# Patient Record
Sex: FEMALE | Race: WHITE
[De-identification: names, ages, dates, MRNs, and addresses within clinical notes are randomized per-mention and may not be internally consistent; named-entity substitution may affect disease eponyms.]

---

## 2021-03-20 ENCOUNTER — HOSPITAL ENCOUNTER (EMERGENCY)
Dept: HOSPITAL 7 - FB.ED | Age: 61
Discharge: HOME | End: 2021-03-20
Payer: MEDICAID

## 2021-03-20 DIAGNOSIS — Z79.82: ICD-10-CM

## 2021-03-20 DIAGNOSIS — M79.621: ICD-10-CM

## 2021-03-20 DIAGNOSIS — Z79.899: ICD-10-CM

## 2021-03-20 DIAGNOSIS — J44.9: ICD-10-CM

## 2021-03-20 DIAGNOSIS — I63.9: Primary | ICD-10-CM

## 2021-03-20 DIAGNOSIS — G81.94: ICD-10-CM

## 2021-03-20 DIAGNOSIS — Z87.891: ICD-10-CM

## 2021-03-20 NOTE — EDM.PDOC
ED HPI GENERAL MEDICAL PROBLEM





- General


Stated Complaint: ARM/WRIST PAIN


Time Seen by Provider: 03/20/21 12:30


Source of Information: Reports: Patient, Family (Patient's daughter)


History Limitations: Reports: Other (Patient is status post stroke and history 

is difficult from her but the daughter gives most of the history.)





- History of Present Illness


INITIAL COMMENTS - FREE TEXT/NARRATIVE: 





60-year-old female who had a left sided stroke resulting in right upper 

extremity weakness and aphasia on 1/23/2021. She was admitted to [facility] from 1/23 to 1/29 and then she was transferred to a nursing home in 

[place] for rehabilitation where she has been until yesterday. All during this 

time she has complained of right upper extremity pain and there has been right 

upper extremity swelling. There has never been any x-ray evaluation of the upper

extremity. Apparently, when the patient had her stroke she fell on her right 

side and may have had trauma to her right upper extremity at that time. Today, 

when the patient was hugging a relative, she was noted to cry out in pain and 

was pointing to her right upper extremity complaining of pain. She is really not

able to localize the pain along her right upper extremity and her dysphasia 

makes it difficult as she is not really able to converse with me she seems to 

have pain along the entire right upper extremity from the humerus down to the 

hand. She rated the pain as a 5/10. She has not able to qualitate the pain. The 

patient's daughter was able to "fill in the gaps" of the history. The patient is

awake and alert but her dysphasia did not allow her to give a complete history. 

No chest pain. No difficulty breathing. There are no other associated signs or 

symptoms. There are no other modifying factors.


  ** Right Upper Arm


Pain Score (Numeric/FACES): 5





- Related Data


                                    Allergies











Allergy/AdvReac Type Severity Reaction Status Date / Time


 


No Known Allergies Allergy   Verified 03/20/21 12:46











Home Meds: 


                                    Home Meds





Aspirin 81 mg PO DAILY 03/20/21 [History]


Escitalopram [Lexapro] 10 mg PO DAILY 03/20/21 [History]











Past Medical History


Respiratory History: Reports: COPD


Neurological History: Reports: CVA, Migraines


Psychiatric History: Reports: Anxiety, Depression





- Past Surgical History


HEENT Surgical History: Reports: Other (See Below) (Ear surgery)





Social & Family History





- Tobacco Use


Tobacco Use Status *Q: Former Tobacco User (Quit smoking on 1/23/2021)





- Alcohol Use


Alcohol Use History: No





- Living Situation & Occupation


Living situation: Reports: 





ED ROS GENERAL





- Review of Systems


Review Of Systems: See Below


Constitutional: Reports: No Symptoms


HEENT: Reports: No Symptoms


Respiratory: Reports: No Symptoms


Cardiovascular: Reports: No Symptoms


Endocrine: Reports: No Symptoms


GI/Abdominal: Reports: No Symptoms


: Reports: No Symptoms


Musculoskeletal: Reports: Other (Right hand dominant. Right upper extremity 

pain. Diffuse swelling of right upper extremity since her CVA.)


Skin: Reports: No Symptoms


Neurological: Reports: No Symptoms (Other than the right hemiparesis and the 

dysphasia)


Hematologic/Lymphatic: Reports: No Symptoms


Immunologic: Reports: No Symptoms





ED EXAM, GENERAL





- Physical Exam


Exam: See Below


Exam Limited By: No Limitations


General Appearance: Alert, WD/WN, No Apparent Distress


Eye Exam: Bilateral Eye: EOMI, Normal Inspection


Ears: Normal External Exam, Hearing Grossly Normal


Ear Exam: Bilateral Ear: Auricle Normal


Nose: Normal Inspection, Normal Mucosa, No Blood


Throat/Mouth: Normal Inspection, No Airway Compromise


Head: Atraumatic, Normocephalic


Neck: Normal Inspection, Supple, Non-Tender, Full Range of Motion


Respiratory/Chest: No Respiratory Distress, Lungs Clear, Normal Breath Sounds, 

No Accessory Muscle Use, Chest Non-Tender


Cardiovascular: Normal Peripheral Pulses, Regular Rate, Rhythm, No Murmur


Peripheral Pulses: 4+: Radial (L), Radial (R)


GI/Abdominal: Normal Bowel Sounds, Soft, Non-Tender


Back Exam: Normal Inspection, Full Range of Motion


Extremities: No Pedal Edema, Normal Capillary Refill, Arm Pain (Diffuse pain 

with palpation from the upper arm to the hand. No crepitus or bony deformity 

noted.)


Neurological: Alert, Oriented, CN II-XII Intact, Other (Right upper extremity 

hemiparesis. Aphasia. These are both old status post 1/23/2021.)


Psychiatric: Normal Affect


Skin Exam: Warm, Dry, Intact, Normal Color, No Rash





Course





- Vital Signs


Last Recorded V/S: 


                                Last Vital Signs











Temp  36.8 C   03/20/21 12:10


 


Pulse  75   03/20/21 12:10


 


Resp  18   03/20/21 12:10


 


BP  124/82   03/20/21 12:10


 


Pulse Ox  98   03/20/21 12:10














- Orders/Labs/Meds


Orders: 


                               Active Orders 24 hr











 Category Date Time Status


 


 Elbow Min 3V Rt [CR] Stat Exams  03/20/21 12:41 Taken


 


 Forearm 2V Rt [CR] Stat Exams  03/20/21 12:41 Taken


 


 Hand Comp Min 3V Rt [CR] Stat Exams  03/20/21 12:41 Taken


 


 Humerus Rt [CR] Stat Exams  03/20/21 12:41 Taken














- Radiology Interpretation


Free Text/Narrative:: 





X-ray of right humerus showed no definite fracture per my read.





X-ray of right elbow showed no definite fracture per my read.





X-ray of right forearm shows no definite fracture per my read.





X-ray of right hand shows no definite fracture in the hand or the wrist per my 

read.





- Re-Assessments/Exams


Free Text/Narrative Re-Assessment/Exam: 





03/20/21 13:25: Patient remains hemodynamically and neurologically stable. The 

x-rays were reviewed by myself and I saw no fracture or dislocation. The 

radiology over read is pending at this point. I discussed this with the and with

 her daughter. For now she should use her right upper extremity as tolerated. 

They should arrange to see Dr. Ray next week if possible.








Departure





- Departure


Time of Disposition: 13:35


Disposition: Home, Self-Care 01


Condition: Good


Clinical Impression: 


 Pain of right upper extremity, Cerebrovascular accident (CVA) involving left 

cerebral hemisphere








- Discharge Information


Additional Instructions: 


The x-rays of her right upper arm, elbow, forearm, wrist and hand showed no 

definite fracture or dislocation. The radiologist will over read the x-rays and 

if there a fracture that I missed, we will call you. I am unsure why she is 

having the pain but I think it may be post stroke pain. You should for follow-up

 with Dr. Ray next week. She can take Tylenol 1000 mg by mouth every 6 hours 

as needed for pain. Back to the emergency department for redness in the right 

upper extremity, fever, increasing swelling or any other concerning sign or 

symptom.





Sepsis Event Note (ED)





- Evaluation


Sepsis Screening Result: No Definite Risk





- Focused Exam


Vital Signs: 


                                   Vital Signs











  Temp Pulse Resp BP Pulse Ox


 


 03/20/21 12:10  36.8 C  75  18  124/82  98














- My Orders


Last 24 Hours: 


My Active Orders





03/20/21 12:41


Elbow Min 3V Rt [CR] Stat 


Forearm 2V Rt [CR] Stat 


Hand Comp Min 3V Rt [CR] Stat 


Humerus Rt [CR] Stat 














- Assessment/Plan


Last 24 Hours: 


My Active Orders





03/20/21 12:41


Elbow Min 3V Rt [CR] Stat 


Forearm 2V Rt [CR] Stat 


Hand Comp Min 3V Rt [CR] Stat 


Humerus Rt [CR] Stat

## 2021-03-22 NOTE — CR
INDICATION:  Fall with injury.  



RIGHT FOREARM:  Frontal and lateral views of the right forearm were obtained 
03/20/21 - no comparisons.  An acute fracture, dislocation or other significant 
bone or joint abnormality, was not identified.  

ROBBIED

## 2021-03-22 NOTE — CR
INDICATION:  Fall with injuries.  



RIGHT ELBOW:  Three views of the right elbow revealed minimal hypertrophic 
degenerative changers at the medial elbow joint compartment with joint space 
well maintained and no other significant bone or joint abnormality identified - 
bi acute fracture or dislocation or joint effusion was seen.  



MTDD

## 2021-03-22 NOTE — CR
INDICATION:  Fall with injury.  



RIGHT HUMERUS:  Frontal and lateral views of the right humerus revealed no 
evidence of an acute fracture, dislocation or other definite bone or joint 
abnormality.  



Adjacent ribs appear to be unremarkable.  





MTDD

## 2021-03-22 NOTE — CR
INDICATION:  Fall with injury.  



RIGHT HAND:     Three views of the right hand were obtained 03/20/21 - no 
comparisons.  



Moderate component with severe hypertrophic degenerative changes and subchondral
cystic changes are noted at the interphalangeal joint of the thumb.  The 
appearance may be on the basis of posttraumatic osteoarthritis and should be 
correlated clinically.



There is some minimal degenerative change at the naviculomultangular joints and 
mild degenerative change at the first metacarpocarpal joint.  



Minimal degenerative changes are also noted at the DIPJs of the second and to 
lesser extent third fingers and fifth finger.  



Minimal [id number] change is noted at the first and second 
metacarpophalangeal joints.  



An acute fracture or dislocation was not identified.  



IMPRESSION: 

1.  No acute or dislocation.  

2.  Osteoarthritis.  

MTDD

## 2021-04-20 ENCOUNTER — HOSPITAL ENCOUNTER (EMERGENCY)
Dept: HOSPITAL 7 - FB.ED | Age: 61
Discharge: HOME | End: 2021-04-20
Payer: MEDICAID

## 2021-04-20 DIAGNOSIS — Z87.891: ICD-10-CM

## 2021-04-20 DIAGNOSIS — J44.9: ICD-10-CM

## 2021-04-20 DIAGNOSIS — M79.641: ICD-10-CM

## 2021-04-20 DIAGNOSIS — Z79.899: ICD-10-CM

## 2021-04-20 DIAGNOSIS — G89.29: ICD-10-CM

## 2021-04-20 DIAGNOSIS — R10.84: Primary | ICD-10-CM

## 2021-04-20 DIAGNOSIS — Z86.73: ICD-10-CM

## 2021-04-20 DIAGNOSIS — Z79.82: ICD-10-CM

## 2021-04-20 DIAGNOSIS — M54.5: ICD-10-CM

## 2021-04-20 PROCEDURE — 85025 COMPLETE CBC W/AUTO DIFF WBC: CPT

## 2021-04-20 PROCEDURE — 83735 ASSAY OF MAGNESIUM: CPT

## 2021-04-20 PROCEDURE — 81001 URINALYSIS AUTO W/SCOPE: CPT

## 2021-04-20 PROCEDURE — 99284 EMERGENCY DEPT VISIT MOD MDM: CPT

## 2021-04-20 PROCEDURE — 96374 THER/PROPH/DIAG INJ IV PUSH: CPT

## 2021-04-20 PROCEDURE — 73110 X-RAY EXAM OF WRIST: CPT

## 2021-04-20 PROCEDURE — 36415 COLL VENOUS BLD VENIPUNCTURE: CPT

## 2021-04-20 PROCEDURE — 74177 CT ABD & PELVIS W/CONTRAST: CPT

## 2021-04-20 PROCEDURE — 72131 CT LUMBAR SPINE W/O DYE: CPT

## 2021-04-20 PROCEDURE — 96375 TX/PRO/DX INJ NEW DRUG ADDON: CPT

## 2021-04-20 PROCEDURE — 80053 COMPREHEN METABOLIC PANEL: CPT

## 2021-04-20 PROCEDURE — 86140 C-REACTIVE PROTEIN: CPT

## 2021-04-20 PROCEDURE — 83690 ASSAY OF LIPASE: CPT

## 2021-04-20 NOTE — EDM.PDOC
ED HPI GENERAL MEDICAL PROBLEM





- General


Chief Complaint: Back Pain or Injury


Stated Complaint: FELL DOWN STAIRS/BACK PAIN


Time Seen by Provider: 04/20/21 14:50


Source of Information: Reports: Patient, Family (Patient's daughter)


History Limitations: Reports: Other (Patient with expressive aphasia and has 

problems verbally communicating.)





- History of Present Illness


INITIAL COMMENTS - FREE TEXT/NARRATIVE: 





60-year-old female who came back from speech therapy at 12:45 PM today and was 

complaining of severe pain in her back and in her right hand and wrist area. 

Apparently last night at about 6:45 PM she was walking down the stairs in to the

living room and they were carpeted and she slipped on the stairs but did not 

fall just slid down the stairs and caught herself on the couch. She did not seem

to have any pain at the time except in her right wrist and hand. She also seemed

to be fine this morning with just some complaints of right wrist and hand pain 

which has been chronic since an injury several months ago. She was crying in 

pain today and her daughter brought her to the emergency department for 

evaluation. The patient has an expressive aphasia from a previous CVA and it is 

very difficult to get history from the patient as she cannot verbally 

communicate. She appears to be a level 10/10 the pain right now points to her 

entire abdomen and to her lumbar back when she reports the pain. She cannot 

describe it any more than this. There is been no nausea or vomiting. She 

apparently ate and drank normally this morning. She denies any chest pain. No 

urinary problems. There are no other associated signs or symptoms. There are no 

other modifying factors.


Onset: Other (Mild pain last night after the near fall but worse since 12:45 PM)


Duration: Getting Worse


Location: Reports: Abdomen, Back, Upper Extremity, Right


Quality: Reports: Other (Unable)


Severity: Severe


Improves with: Reports: None


Worsens with: Reports: Other (Palpation), Movement


Context: Reports: Other (As above.)


Associated Symptoms: Reports: No Other Symptoms (Except as above.)


Treatments PTA: Reports: Acetaminophen, Cold Therapy, Heat Therapy, NSAIDS





- Related Data


                                    Allergies











Allergy/AdvReac Type Severity Reaction Status Date / Time


 


No Known Allergies Allergy   Verified 04/20/21 14:30











Home Meds: 


                                    Home Meds





Aspirin 81 mg PO DAILY 03/20/21 [History]


Gabapentin [Neurontin] 100 mg PO TID 04/20/21 [History]


Orphenadrine [Norflex] 100 mg PO BID PRN #12 tab 04/20/21 [Rx]


buPROPion [buPROPion XL] 150 mg PO DAILY 04/20/21 [History]


traMADol [Ultram] 50 mg PO Q6H PRN #12 tab 04/20/21 [Rx]











Past Medical History


Respiratory History: Reports: COPD


Neurological History: Reports: CVA, Migraines


Other Neuro History: Hx of left side stroke (due to a clot in her leg per 

daughter report).  R sided hemiparesis.  Expressive aphasia.


Psychiatric History: Reports: Anxiety, Depression





- Past Surgical History


Other Surgical History Comment: No previous surgeries.





Social & Family History





- Tobacco Use


Tobacco Use Status *Q: Former Tobacco User


Used Tobacco, but Quit: Yes


Month/Year Tobacco Last Used: 2021





- Caffeine Use


Caffeine Use: Reports: Soda





- Alcohol Use


Alcohol Use History: No





- Recreational Drug Use


Recreational Drug Use: No





- Living Situation & Occupation


Living situation: Reports: 


Social History Comment: Lives with her daughter.





ED ROS GENERAL





- Review of Systems


Review Of Systems: See Below


Constitutional: Reports: No Symptoms


HEENT: Reports: No Symptoms


Respiratory: Reports: No Symptoms


Cardiovascular: Reports: No Symptoms


Endocrine: Reports: No Symptoms


GI/Abdominal: Reports: Abdominal Pain.  Denies: Nausea, Vomiting


: Reports: No Symptoms


Musculoskeletal: Reports: Back Pain (Lumbar back pain)


Skin: Reports: No Symptoms


Neurological: Reports: No Symptoms


Psychiatric: Reports: No Symptoms


Hematologic/Lymphatic: Reports: No Symptoms


Immunologic: Reports: No Symptoms





ED EXAM, GENERAL





- Physical Exam


Exam: See Below


Exam Limited By: No Limitations


General Appearance: Alert, WD/WN, Moderate Distress (Ramin acute pain.)


Eye Exam: Bilateral Eye: EOMI, Normal Inspection


Ears: Normal External Exam, Hearing Grossly Normal


Ear Exam: Bilateral Ear: Auricle Normal


Nose: Normal Inspection, Normal Mucosa, No Blood


Throat/Mouth: Normal Inspection, Normal Lips, Normal Oropharynx, Normal Voice, 

No Airway Compromise


Head: Atraumatic, Normocephalic


Neck: Normal Inspection, Supple, Non-Tender, Full Range of Motion


Respiratory/Chest: No Respiratory Distress, Lungs Clear, Normal Breath Sounds, 

No Accessory Muscle Use, Chest Non-Tender


Cardiovascular: Normal Peripheral Pulses, Regular Rate, Rhythm, No Murmur


Peripheral Pulses: 2+: Radial (L), Radial (R)


GI/Abdominal: Normal Bowel Sounds, Soft, No Mass, Tender (Tender diffusely.)


Back Exam: Decreased Range of Motion (Secondary to pain.), Muscle Spasm (In 

lumbar spine.), Paraspinal Tenderness.  No: Vertebral Tenderness


Extremities: Normal Inspection, Normal Capillary Refill, Other (Tender to 

palpation diffusely over right hand and right wrist area. No crepitus. No bony 

deformity noted.)


Neurological: Alert, Oriented, Other (Expressive aphasia. Some right-sided 

hemiparesis)


Skin Exam: Warm, Dry, Intact, Normal Color, No Rash





Course





- Vital Signs


Last Recorded V/S: 


                                Last Vital Signs











Temp  36.7 C   04/20/21 14:20


 


Pulse  89   04/20/21 14:20


 


Resp  24 H  04/20/21 14:20


 


BP  114/60   04/20/21 14:20


 


Pulse Ox  97   04/20/21 14:20














- Orders/Labs/Meds


Orders: 


                               Active Orders 24 hr











 Category Date Time Status


 


 Wrist Comp Min 3V Rt [CR] Stat Exams  04/20/21 15:12 Taken


 


 Peripheral IV Insertion Adult [OM.PC] Routine Oth  04/20/21 15:10 Ordered











Labs: 


                                Laboratory Tests











  04/20/21 04/20/21 04/20/21 Range/Units





  15:20 15:20 15:20 


 


WBC  9.7    (3.0-10.3)  x10-3/uL


 


RBC  4.38    (3.60-5.20)  x10(6)uL


 


Hgb  14.0    (11.4-15.5)  g/dL


 


Hct  41.3    (34.2-48.2)  %


 


MCV  94.3    (76.7-100.5)  fL


 


MCH  32.0    (23.9-33.9)  pg


 


MCHC  34.0    (31.9-34.8)  g/dL


 


RDW  14.1    (12.3-16.5)  %


 


Plt Count  428    (151-488)  x10(3)uL


 


MPV  7.3    (7.1-12.4)  fL


 


Neut % (Auto)  52.9    (30.8-76.2)  %


 


Lymph % (Auto)  35.8    (18.4-52.1)  %


 


Mono % (Auto)  7.0    (4.4-15.7)  %


 


Eos % (Auto)  3.2    (0.6-8.1)  %


 


Baso % (Auto)  1.1    (0.2-1.5)  %


 


Neut # (Auto)  5.1    (1.5-6.3)  x10-3/uL


 


Lymph # (Auto)  3.5    (1.0-4.4)  x10-3/uL


 


Mono # (Auto)  0.7    (0.3-1.0)  x10-3/uL


 


Eos # (Auto)  0.3    (0.0-0.8)  x10-3/uL


 


Baso # (Auto)  0.1    (0.0-0.1)  x10-3/uL


 


Sodium   143   (135-145)  mmol/L


 


Potassium   4.0   (3.5-5.3)  mmol/L


 


Chloride   104   (100-110)  mmol/L


 


Carbon Dioxide   28   (21-32)  mmol/L


 


BUN   13   (7-18)  mg/dL


 


Creatinine   1.1 H   (0.55-1.02)  mg/dL


 


Est Cr Clr Drug Dosing   TNP   


 


Estimated GFR (MDRD)   51 L   (>60)  


 


BUN/Creatinine Ratio   11.8   (9-20)  


 


Glucose   104   ()  mg/dL


 


Calcium   9.1   (8.6-10.2)  mg/dL


 


Magnesium     (1.8-2.5)  mg/dL


 


Total Bilirubin   0.3   (0.1-1.3)  mg/dL


 


AST   19   (5-25)  IU/L


 


ALT   32   (12-36)  U/L


 


Alkaline Phosphatase   101   ()  IU/L


 


C-Reactive Protein    0.4 L  (0.5-0.9)  mg/dL


 


Total Protein   6.9   (6.0-8.0)  g/dL


 


Albumin   3.5   (3.2-4.6)  g/dL


 


Globulin   3.4   g/dL


 


Albumin/Globulin Ratio   1.0   


 


Lipase    165  ()  U/L


 


Urine Color     (YELLOW)  


 


Urine Appearance     (CLEAR)  


 


Urine pH     (5.0-6.5)  


 


Ur Specific Gravity     (1.010-1.025)  


 


Urine Protein     (NEGATIVE)  mg/dL


 


Urine Glucose (UA)     (NORMAL)  mg/dL


 


Urine Ketones     (NEGATIVE)  mg/dL


 


Urine Occult Blood     (NEGATIVE)  


 


Urine Nitrite     (NEGATIVE)  


 


Urine Bilirubin     (NEGATIVE)  


 


Urine Urobilinogen     (NEGATIVE)  mg/dL


 


Ur Leukocyte Esterase     (NEGATIVE)  


 


Urine RBC     (0-5)  


 


Urine WBC     (0-5)  


 


Ur Squamous Epith Cells     (NS,R,O)  


 


Urine Bacteria     (NS)  














  04/20/21 04/20/21 Range/Units





  15:20 16:37 


 


WBC    (3.0-10.3)  x10-3/uL


 


RBC    (3.60-5.20)  x10(6)uL


 


Hgb    (11.4-15.5)  g/dL


 


Hct    (34.2-48.2)  %


 


MCV    (76.7-100.5)  fL


 


MCH    (23.9-33.9)  pg


 


MCHC    (31.9-34.8)  g/dL


 


RDW    (12.3-16.5)  %


 


Plt Count    (151-488)  x10(3)uL


 


MPV    (7.1-12.4)  fL


 


Neut % (Auto)    (30.8-76.2)  %


 


Lymph % (Auto)    (18.4-52.1)  %


 


Mono % (Auto)    (4.4-15.7)  %


 


Eos % (Auto)    (0.6-8.1)  %


 


Baso % (Auto)    (0.2-1.5)  %


 


Neut # (Auto)    (1.5-6.3)  x10-3/uL


 


Lymph # (Auto)    (1.0-4.4)  x10-3/uL


 


Mono # (Auto)    (0.3-1.0)  x10-3/uL


 


Eos # (Auto)    (0.0-0.8)  x10-3/uL


 


Baso # (Auto)    (0.0-0.1)  x10-3/uL


 


Sodium    (135-145)  mmol/L


 


Potassium    (3.5-5.3)  mmol/L


 


Chloride    (100-110)  mmol/L


 


Carbon Dioxide    (21-32)  mmol/L


 


BUN    (7-18)  mg/dL


 


Creatinine    (0.55-1.02)  mg/dL


 


Est Cr Clr Drug Dosing    


 


Estimated GFR (MDRD)    (>60)  


 


BUN/Creatinine Ratio    (9-20)  


 


Glucose    ()  mg/dL


 


Calcium    (8.6-10.2)  mg/dL


 


Magnesium  1.9   (1.8-2.5)  mg/dL


 


Total Bilirubin    (0.1-1.3)  mg/dL


 


AST    (5-25)  IU/L


 


ALT    (12-36)  U/L


 


Alkaline Phosphatase    ()  IU/L


 


C-Reactive Protein    (0.5-0.9)  mg/dL


 


Total Protein    (6.0-8.0)  g/dL


 


Albumin    (3.2-4.6)  g/dL


 


Globulin    g/dL


 


Albumin/Globulin Ratio    


 


Lipase    ()  U/L


 


Urine Color   Yellow  (YELLOW)  


 


Urine Appearance   Clear  (CLEAR)  


 


Urine pH   5.0  (5.0-6.5)  


 


Ur Specific Gravity   1.025  (1.010-1.025)  


 


Urine Protein   Negative  (NEGATIVE)  mg/dL


 


Urine Glucose (UA)   Normal  (NORMAL)  mg/dL


 


Urine Ketones   Negative  (NEGATIVE)  mg/dL


 


Urine Occult Blood   Negative  (NEGATIVE)  


 


Urine Nitrite   Negative  (NEGATIVE)  


 


Urine Bilirubin   Negative  (NEGATIVE)  


 


Urine Urobilinogen   Normal  (NEGATIVE)  mg/dL


 


Ur Leukocyte Esterase   Negative  (NEGATIVE)  


 


Urine RBC   0-5  (0-5)  


 


Urine WBC   0-5  (0-5)  


 


Ur Squamous Epith Cells   Few H  (NS,R,O)  


 


Urine Bacteria   Rare H  (NS)  











Meds: 


Medications














Discontinued Medications














Generic Name Dose Route Start Last Admin





  Trade Name Derrickq  PRN Reason Stop Dose Admin


 


Hydromorphone HCl  0.5 mg  04/20/21 15:11  04/20/21 15:19





  Hydromorphone 2 Mg/Ml Sdv  IVPUSH  04/20/21 15:12  0.5 mg





  ONETIME ONE   Administration


 


Sodium Chloride  500 mls @ 999 mls/hr  04/20/21 15:11  04/20/21 15:17





  Normal Saline  IV  04/20/21 15:41  999 mls/hr





  .BOLUS ONE   Administration


 


Sodium Chloride  1,000 mls @ 125 mls/hr  04/20/21 15:15  04/20/21 16:01





  Normal Saline  IV   125 mls/hr





  ASDIRECTED MATTHEW   Administration


 


Iopamidol  85 ml  04/20/21 16:46  04/20/21 16:55





  Iopamidol 755 Mg/Ml 100 Ml Bottle  IV  04/20/21 16:47  85 ml





  .AS DIRECTED ONE   Administration


 


Ketorolac Tromethamine  30 mg  04/20/21 17:57  04/20/21 18:05





  Ketorolac 30 Mg/Ml Sdv  IVPUSH  04/20/21 17:58  30 mg





  ONETIME ONE   Administration


 


Lorazepam  1 mg  04/20/21 18:18  04/20/21 18:29





  Lorazepam 2 Mg/Ml Sdv  IVPUSH  04/20/21 18:19  1 mg





  ONETIME ONE   Administration


 


Ondansetron HCl  4 mg  04/20/21 15:11  04/20/21 15:21





  Ondansetron 4 Mg/2 Ml Sdv  IVPUSH  04/20/21 15:12  4 mg





  ONETIME ONE   Administration


 


Sodium Chloride  10 ml  04/20/21 15:10  04/20/21 15:26





  Sodium Chloride 0.9% 10 Ml Syringe  FLUSH   10 ml





  ASDIRECTED PRN   Administration





  Keep Vein Open  














- Radiology Interpretation


Free Text/Narrative:: 





CT scan of abdomen and pelvis shows mildly dilated common bile duct of unclear 

significance. There is also possibly a thickened bladder wall of unclear 

significance. This was per Dr. Connor.





CT scan of lumbar spine shows DDD and DJD but no significant abnormality per Dr. Connor.





- Re-Assessments/Exams


Free Text/Narrative Re-Assessment/Exam: 





04/20/21 17:50: Patient had had good relief from 0.5 mg earlier but now has 

reported that her pain has come back. I am awaiting the results of the CT scans 

of her abdomen and pelvis and her lumbar spine. Her blood tests are all 

reassuringly normal. Her urinalysis was clear. She has remained vitally stable. 

I will give the patient Toradol 30 mg IV.





04/20/21 18:00: I discussed patient's case with Dr. Connor and he reviewed the CT

 scan results with me as noted above. The patient does not have any evidence of 

a UTI nor does she have any elevated liver function tests or amylase and I don't

 see any definite correlation between the findings on the abdominal CT and her 

symptoms here. The CT scan of her lumbar spine showed degenerative changes but 

no fracture and no malalignment. With her normal labs and the CT scans which are

 unrevealing as to the cause of her symptoms, I am unsure why she is having the 

symptoms of left-sided abdominal pain and back pain. This could be 

musculoskeletal in nature but it does not appear to be anything of a serious 

nature at this point.





04/20/21 18:15: I reevaluated the patient at this time and she is really having 

no relief from the Toradol as yet. She is reporting pain in her left flank and 

her back. She does have some muscle spasm in her lower back. I discussed all 

results with the patient and with her daughter. I answered their questions. I 

will give the patient Ativan 1 mg IV and I will send a prescription for tramadol

 for pain and Norflex for muscle relaxation. She is to follow-up with her 

primary provider she should avoid any strenuous activity for the next few days. 

Precautions and reasons for return to the emergency department were discussed 

with the patient's daughter while the patient was in the emergency department 

and were detailed in the patient's discharge instructions. They feel comfortable

 with the plan for discharge. A take-home pack tramadol was sent with the 

patient.











Departure





- Departure


Time of Disposition: 18:30


Disposition: Home, Self-Care 01


Condition: Good (Stable.)


Clinical Impression: 


 Abdominal pain of unknown cause, Lumbar back pain, Chronic pain of right hand








- Discharge Information


Prescriptions: 


Orphenadrine [Norflex] 100 mg PO BID PRN #12 tab


 PRN Reason: Muscle spasm or muscle pain


traMADol [Ultram] 50 mg PO Q6H PRN #12 tab


 PRN Reason: Moderate to severe pain


Instructions:  Acute Back Pain, Adult, Abdominal Pain, Adult, Easy-to-Read


Referrals: 


Ryder Ray MD [Primary Care Provider] - 


Forms:  ED Department Discharge


Additional Instructions: 


All of your blood tests were reassuringly normal. The urine test was normal. The

x-ray of her right hand and wrist showed no fracture. The CT scans of her 

abdomen and pelvis and the lower back showed no evidence of fracture or any 

other acute abnormality to explain the patient's abdominal and flank pain and 

back pain. I am unsure why you are having the pain in your left side and back. 

It appears to be related to the near fall that you had yesterday is most 

probably a muscle strain. You should avoid any strenuous activity. You should 

ambulate as tolerated. Increase your fluid intake. Medication as prescribed 

(tramadol, Norflex). Follow-up with your primary provider. Back to the emergency

department for marked increase in pain, blood in your urine, high fevers, severe

weakness, unrelenting vomiting or any other concerning signs or symptoms.





Sepsis Event Note (ED)





- Evaluation


Sepsis Screening Result: No Definite Risk





- Focused Exam


Vital Signs: 


                                   Vital Signs











  Temp Pulse Resp BP Pulse Ox


 


 04/20/21 14:20  36.7 C  89  24 H  114/60  97














- My Orders


Last 24 Hours: 


My Active Orders





04/20/21 15:10


Peripheral IV Insertion Adult [OM.PC] Routine 





04/20/21 15:12


Wrist Comp Min 3V Rt [CR] Stat 














- Assessment/Plan


Last 24 Hours: 


My Active Orders





04/20/21 15:10


Peripheral IV Insertion Adult [OM.PC] Routine 





04/20/21 15:12


Wrist Comp Min 3V Rt [CR] Stat

## 2021-04-20 NOTE — CT
INDICATION:  Severe abdominal pain.



CT ABDOMEN AND PELVIS WITH IV CONTRAST:  Spiral 3.75 mm axial sections were 
obtained through the abdomen and pelvis with 85 mL Isovue-370 at 2 mL per second
with sagittal and coronal reconstructions 04/20/21 - no comparisons.  

Total exam DLP was 429.83 mGy-cm.  



In the middle lobe and lingula there are some minimal areas of either fibrosis 
or possibly subsegmental atelectasis, also similar finding in the left lower 
lobe anterolaterally, no definite consolidating pneumonia or effusion was seen. 


The heart appears to be near the upper limits of normal in size.  

No pericardial effusion was seen.  



The liver had a normal appearance, as did the gallbladder.  



There was a small simple appearing cyst at the upper pole of the right kidney 
medially, most exophytic.  

The kidneys were otherwise unremarkable.  

The spleen and pancreas appear to be unremarkable.  Common bile duct is slightly
 prominent in appearance appearing to measure 6.9 mm which is slightly enlarged 
for this age group.  This finding should be correlated clinically.  The 
intrahepatic biliary tree did not appear to be significantly enlarged.  MRCP may
be warranted depending upon clinical and laboratory correlation.  

No pancreatic head mass or calculus at the distal common bile duct could be 
identified.  

Calcifications are noted in the aorta, iliac and femoral arteries.  



The appendix is absent compatible with history of its removal with surgical 
clips in that area.  



No evidence of free air or bowel obstruction was identified.  



Scattered diverticula are noted in the descending and sigmoid colon without 
definite evidence of diverticulitis.  



The urinary bladder was not distended.  The wall appears to be rather thickened 
which may be partly on the basis of lack of distention, but could represent 
cystitis - correlate clinically.  



Otherwise, no organomegaly, mass lesions or free fluid collections were 
identified in the abdomen or pelvis.  



IMPRESSION: 

1.  Thickening of the urinary bladder wall is strongly suggested - correlate 
clinically as it may represent cystitis. 

2.  Probable simple cyst upper pole right kidney. 

3.  ASD/probable ASHD. 

4.  Post appendectomy. 

5.  Narrowing of the L4-5 disk suggests disk disease at that level.  

6.  T11-12 disk disease is suggested with hypertrophic changes in the visualized
thoracic spine off vertebral bodies anteriorly. 

7.  Minimal diverticulosis coli without evidence of diverticulitis.  

8.  Somewhat dilated common bile duct measuring approximately 6.9 mm, etiology 
indeterminate.  MRCP may be warranted depending upon clinical and laboratory 
correlation.  



Report was called to Dr. Alba at 1804 hours.

[facility]

## 2021-04-20 NOTE — CT
INDICATION:  Severe lumbar back pain after a fall.  



CT LUMBOSACRAL SPINE WITHOUT CONTRAST:  Spiral examination of the lumbosacral 
spine was obtained with sagittal, coronal and axial reconstructions with the 
axial reconstructions through the L3-4, L4-5, and L5-S1 disk spaces.  



There is suggestion of very minimal decrease in disk space at L4-5.  



Vertebral body and disk heights appear to be fairly well maintained.  



Hypertrophic changes are noted off vertebral bodies anteriorly, most prominent 
at L4 and relatively minimal elsewhere in the lumbosacral spine, but becoming 
prominent again in the lower thoracic spine.  



There is narrowing and sclerosis of L4-5 and L5-S1, as well as L3-4 apophyseal 
joints most prominent at L5-S1 on the left and at L4-5 bilaterally, at L3-4 on 
the right.  



A definite acute fracture or dislocation was not identified.  



Degenerative changes are noted at the left sacroiliac joint with the sacroiliac 
joints otherwise unremarkable.  



There appears to be mild spinal stenosis at L2-3 and moderate spinal stenosis at
L3-4, L4-5, and minimally at L5-S1 due to prominent ligamenta flava and mildly 
bulging disks.  



IMPRESSION: 

1.  No definite acute fracture or dislocation. 

2.  Disk disease L2 through S1 with some spinal stenosis present.  

3.  Hypertrophic degenerative changes as noted above with the only decreased 
disk space suggested at L4-5.  



Report was called to Dr. Alba at 1804 hours.  

[facility]

## 2021-04-21 NOTE — CR
INDICATION:  Fall with pain in right wrist and hand.  



RIGHT WRIST:  Three views of the right wrist including most of the hand were 
obtained 04/20/21 and compared with right hand dated 03/20/21.  



Osteoarthritic changes are again noted.



There is an appearance suggesting periarticular demineralization which could 
represent additional inflammatory arthritis.  



A definite acute fracture or dislocation was not identified.  



MTDD

## 2021-10-04 ENCOUNTER — HOSPITAL ENCOUNTER (EMERGENCY)
Dept: HOSPITAL 7 - FB.ED | Age: 61
Discharge: HOME | End: 2021-10-04
Payer: MEDICAID

## 2021-10-04 DIAGNOSIS — J44.9: ICD-10-CM

## 2021-10-04 DIAGNOSIS — Z79.01: ICD-10-CM

## 2021-10-04 DIAGNOSIS — Z79.82: ICD-10-CM

## 2021-10-04 DIAGNOSIS — Z79.899: ICD-10-CM

## 2021-10-04 DIAGNOSIS — Z86.73: ICD-10-CM

## 2021-10-04 DIAGNOSIS — R79.89: Primary | ICD-10-CM

## 2021-10-04 NOTE — EDM.PDOC
ED HPI GENERAL MEDICAL PROBLEM





- General


Chief Complaint: Lower Extremity Injury/Pain


Stated Complaint: R LEG PAIN


Time Seen by Provider: 10/04/21 13:35


Source of Information: Reports: Patient


History Limitations: Reports: No Limitations





- History of Present Illness


INITIAL COMMENTS - FREE TEXT/NARRATIVE: 





Patient presented to the ED because of RT leg pain. She woke up with it. There 

is no recent trauma or injury, no chest pain or dyspnea.


  ** Right Thigh


Pain Score (Numeric/FACES): 10





- Related Data


                                    Allergies











Allergy/AdvReac Type Severity Reaction Status Date / Time


 


No Known Allergies Allergy   Verified 04/20/21 14:30











Home Meds: 


                                    Home Meds





Aspirin 81 mg PO DAILY 03/20/21 [History]


Gabapentin [Neurontin] 100 mg PO TID 04/20/21 [History]


Orphenadrine [Norflex] 100 mg PO BID PRN #12 tab 04/20/21 [Rx]


buPROPion [buPROPion XL] 150 mg PO DAILY 04/20/21 [History]


traMADol [Ultram] 50 mg PO Q6H PRN #12 tab 04/20/21 [Rx]


Apixaban [Eliquis] 2.5 mg PO BID #46 tablet 10/04/21 [Rx]











Past Medical History


HEENT History: Reports: Other (See Below)


Respiratory History: Reports: COPD


Musculoskeletal History: Reports: Other (See Below)


Other Musculoskeletal History: right sided weakness post stroke


Neurological History: Reports: CVA, Migraines


Other Neuro History: Hx of left side stroke (due to a clot in her leg per 

daughter report).  R sided hemiparesis.  Expressive aphasia.


Psychiatric History: Reports: Anxiety, Depression


Hematologic History: Reports: None


Oncologic (Cancer) History: Reports: None





- Past Surgical History


Other Surgical History Comment: No previous surgeries.





Social & Family History





- Family History


Family Medical History: No Pertinent Family History





- Caffeine Use


Caffeine Use: Reports: Soda





- Living Situation & Occupation


Living situation: Reports: 





Review of Systems





- Review of Systems


Review Of Systems: See Below


Constitutional: Reports: No Symptoms


Eyes: Reports: No Symptoms


Ears: Reports: No Symptoms


Nose: Reports: No Symptoms


Mouth/Throat: Reports: No Symptoms


Respiratory: Reports: No Symptoms


Cardiovascular: Reports: No Symptoms


GI/Abdominal: Reports: No Symptoms


Genitourinary: Reports: No Symptoms


Musculoskeletal: Reports: Leg Pain


Skin: Reports: No Symptoms


Neurological: Reports: No Symptoms


Psychiatric: Reports: No Symptoms





ED EXAM, GENERAL





- Physical Exam


Exam: See Below


Exam Limited By: No Limitations


General Appearance: Alert, No Apparent Distress


Ears: Normal External Exam, Normal Canal


Nose: Normal Inspection, Normal Mucosa, No Blood


Throat/Mouth: Normal Inspection, Normal Lips, Normal Teeth, Normal Gums


Head: Atraumatic, Normocephalic


Neck: Normal Inspection, Supple, Non-Tender, Full Range of Motion


Respiratory/Chest: No Respiratory Distress, Lungs Clear, Normal Breath Sounds


Cardiovascular: Normal Peripheral Pulses, Regular Rate, Rhythm, No Edema, No 

Gallop, No JVD, No Murmur, No Rub


GI/Abdominal: Normal Bowel Sounds, Soft, Non-Tender, No Organomegaly, No 

Distention, No Abnormal Bruit


Back Exam: Normal Inspection, Full Range of Motion


Extremities: Normal Inspection, Normal Range of Motion, Other (tenderness rt 

calf area)


Neurological: Alert, Oriented, CN II-XII Intact, Normal Cognition


Psychiatric: Normal Affect


Skin Exam: Warm





Course





- Vital Signs


Text/Narrative:: 





D-Dimer-elevated


Patient was scheduled to have a doppler US of the RLE this Wed at 12:30 pm. Fo 

the time being, I'll cover her with eliquis 2.5 mg twice daily until the US 

result is back. Continue eliquis and follow up if positive. 


Doppler US-negative


Last Recorded V/S: 


                                Last Vital Signs











Temp  36.5 C   10/04/21 13:30


 


Pulse  63   10/04/21 13:30


 


Resp  18   10/04/21 13:30


 


BP  105/75   10/04/21 13:30


 


Pulse Ox  97   10/04/21 13:30














- Orders/Labs/Meds


Labs: 


                                Laboratory Tests











  10/04/21 Range/Units





  13:40 


 


D-Dimer, Quantitative  0.77 H  (0.0-0.59)  mg/LFEU














Departure





- Departure


Time of Disposition: 15:00


Disposition: Home, Self-Care 01


Condition: Good


Clinical Impression: 


 Elevated d-dimer








- Discharge Information


Prescriptions: 


Apixaban [Eliquis] 2.5 mg PO BID #46 tablet


Instructions:  D-Dimer Test


Referrals: 


Ryder Ray MD [Primary Care Provider] - 


Forms:  ED Department Discharge


Additional Instructions: 


Please read discharge instructions on elevated d-dimer


Somebody from radiology will call you tomorrow


Keep your appointment for a doppler ultrasound of your right leg


Eliquis 2.5 mg twice daily for 3 days(will prescribe you more if you have blood 

clot in your leg)


Follow up as needed





Sepsis Event Note (ED)





- Evaluation


Sepsis Screening Result: No Definite Risk